# Patient Record
Sex: FEMALE | Race: WHITE | Employment: FULL TIME | URBAN - METROPOLITAN AREA
[De-identification: names, ages, dates, MRNs, and addresses within clinical notes are randomized per-mention and may not be internally consistent; named-entity substitution may affect disease eponyms.]

---

## 2021-04-08 DIAGNOSIS — Z23 ENCOUNTER FOR IMMUNIZATION: ICD-10-CM

## 2021-04-21 ENCOUNTER — IMMUNIZATIONS (OUTPATIENT)
Dept: FAMILY MEDICINE CLINIC | Facility: HOSPITAL | Age: 56
End: 2021-04-21

## 2021-04-21 DIAGNOSIS — Z23 ENCOUNTER FOR IMMUNIZATION: Primary | ICD-10-CM

## 2021-04-21 PROCEDURE — 91300 SARS-COV-2 / COVID-19 MRNA VACCINE (PFIZER-BIONTECH) 30 MCG: CPT

## 2021-04-21 PROCEDURE — 0001A SARS-COV-2 / COVID-19 MRNA VACCINE (PFIZER-BIONTECH) 30 MCG: CPT

## 2021-04-23 RX ORDER — IBUPROFEN 200 MG
1 TABLET ORAL AS NEEDED
COMMUNITY

## 2021-04-23 RX ORDER — LORAZEPAM 0.5 MG/1
1 TABLET ORAL AS NEEDED
COMMUNITY
Start: 2021-03-10

## 2021-04-23 RX ORDER — PSEUDOEPHEDRINE HYDROCHLORIDE 30 MG/1
1 TABLET ORAL AS NEEDED
COMMUNITY

## 2021-04-24 PROBLEM — N83.292 OTHER OVARIAN CYST, LEFT SIDE: Status: ACTIVE | Noted: 2021-04-24

## 2021-04-24 PROBLEM — N92.0 MENORRHAGIA WITH REGULAR CYCLE: Status: ACTIVE | Noted: 2021-04-24

## 2021-04-24 PROBLEM — Z86.711 HISTORY OF PULMONARY EMBOLISM: Status: ACTIVE | Noted: 2021-04-24

## 2021-04-24 PROBLEM — D25.9 UTERINE FIBROID: Status: ACTIVE | Noted: 2021-04-24

## 2021-04-26 ENCOUNTER — ANNUAL EXAM (OUTPATIENT)
Dept: OBGYN CLINIC | Facility: CLINIC | Age: 56
End: 2021-04-26
Payer: COMMERCIAL

## 2021-04-26 VITALS
HEIGHT: 64 IN | BODY MASS INDEX: 23.76 KG/M2 | DIASTOLIC BLOOD PRESSURE: 70 MMHG | SYSTOLIC BLOOD PRESSURE: 112 MMHG | WEIGHT: 139.2 LBS

## 2021-04-26 DIAGNOSIS — N83.292 OTHER OVARIAN CYST, LEFT SIDE: ICD-10-CM

## 2021-04-26 DIAGNOSIS — D25.1 INTRAMURAL LEIOMYOMA OF UTERUS: ICD-10-CM

## 2021-04-26 DIAGNOSIS — Z12.31 BREAST CANCER SCREENING BY MAMMOGRAM: Primary | ICD-10-CM

## 2021-04-26 PROCEDURE — 99396 PREV VISIT EST AGE 40-64: CPT | Performed by: OBSTETRICS & GYNECOLOGY

## 2021-04-26 PROCEDURE — 3008F BODY MASS INDEX DOCD: CPT | Performed by: OBSTETRICS & GYNECOLOGY

## 2021-04-26 PROCEDURE — 1036F TOBACCO NON-USER: CPT | Performed by: OBSTETRICS & GYNECOLOGY

## 2021-04-26 NOTE — ASSESSMENT & PLAN NOTE
Associated with heavy bleeding, last u/s 2/2021 stable overall uterine size; previous adenomyosis comments noted  Previously one 3 6 cm fibroid, last u/s with 4 and 3 6 cm fibroids delineated  Will reevaluate with f/u imaging for ovarian cyst 5/2021

## 2021-04-26 NOTE — ASSESSMENT & PLAN NOTE
Had 10/2020 D&C, followed by reasonable cycle x 2 months, now heavy with clots  Options reviewed again - unable to pursue hormonal management due to h/o PE, declines Lupron, option of ablation reviewed again, definitive therapy with LH BS but significant PE risk reviewed  Will continue to monitor, instructed to call for menses occuring earlier than 21 days, lasting longer than 10 days or for bleeding between cycles  Will f/u hg with PCP, last 2/2021 was 12  Taking oral iron

## 2021-04-26 NOTE — LETTER
April 26, 2021     Ирина Haider MD  [address] [facility] [address]    Patient: Erwin So   YOB: 1965   Date of Visit: 4/26/2021       Dear Dr Amna Augustine:    Thank you for referring Erwin So to me for evaluation  Below are my notes for this consultation  If you have questions, please do not hesitate to call me  I look forward to following your patient along with you  Sincerely,        Yolanda Carter MD        CC: No Recipients  Yolanda Carter MD  4/26/2021  1:37 PM  Sign when Signing Visit  Assessment/Plan:  Continued menorrhagia with regular cycle six months from [facility] D&C  Normal breast exam, mammo order given  Unchanged pelvic exam, u/s follow up after next menses for ovarian cyst and fibroids  Premenstrual NSAIDs encouraged  Pap due 2023  Will f/u hemoglobin with PCP, last normal      Menorrhagia with regular cycle  Had 10/2020 D&C, followed by reasonable cycle x 2 months, now heavy with clots  Options reviewed again - unable to pursue hormonal management due to h/o PE, declines Lupron, option of ablation reviewed again, definitive therapy with LH BS but significant PE risk reviewed  Will continue to monitor, instructed to call for menses occuring earlier than 21 days, lasting longer than 10 days or for bleeding between cycles  Will f/u hg with PCP, last 2/2021 was 12  Taking oral iron  Uterine fibroid  Associated with heavy bleeding, last u/s 2/2021 stable overall uterine size; previous adenomyosis comments noted  Previously one 3 6 cm fibroid, last u/s with 4 and 3 6 cm fibroids delineated  Will reevaluate with f/u imaging for ovarian cyst 5/2021  Other ovarian cyst, left side  Follow up u/s, order given, will contact (isacc davis) with results         Diagnoses and all orders for this visit:    Breast cancer screening by mammogram  -     Mammo screening bilateral w 3d & cad; Future    Intramural leiomyoma of uterus  -     US pelvis complete w transvaginal; Future    Other ovarian cyst, left side  -     US pelvis complete w transvaginal; Future    Other orders  -     Iron, Ferrous Sulfate, 325 (65 Fe) MG TABS; Take 1 tablet by mouth daily  -     LORazepam (Ativan) 0 5 mg tablet; Take 1 tablet by mouth as needed As needed before flying  -     Multiple Vitamin (MULTIVITAMIN PO); Take 1 tablet by mouth daily  -     pseudoephedrine (SUDAFED) 30 mg tablet; Take 1 tablet by mouth as needed  -     ibuprofen (Advil) 200 mg tablet; Take 1 tablet by mouth as needed          Subjective:      Patient ID: Wade Ramos is a 49 y o  female  HPI Presents for well check and discussion of fibroids and menorrhagia  The following portions of the patient's history were reviewed and updated as appropriate: allergies, current medications, past family history, past medical history, past social history, past surgical history and problem list     Review of Systems  No breast, bladder, bowel changes   No new persistent pain, bloating, early satiety or pelvic pressure  Still with heavy, regular menses with clots lasting up to 10 days, no IMB    Objective:      /70 (BP Location: Left arm, Patient Position: Sitting, Cuff Size: Large)   Ht 5' 3 5" (1 613 m)   Wt 63 1 kg (139 lb 3 2 oz)   BMI 24 27 kg/m²          Physical Exam  General appearance: no distress, pleasant  Neck: thyroid without nodules or thyromegaly, no palpable adenopathy  Lymph nodes: no palpable adenopathy  Breasts: no masses, nodes or skin changes  Abdomen: soft, non tender, no palpable masses  Pelvic exam: normal external genitalia, urethral meatus normal, vagina without lesions, cervix without lesions, uterus 8-10 wks, firm, posterior irregular as in past, non tender, no adnexal masses, non tender  Rectal exam: no masses, non tender, RV confirms above

## 2021-04-26 NOTE — PROGRESS NOTES
Assessment/Plan:  Continued menorrhagia with regular cycle six months from [facility] D&C  Normal breast exam, mammo order given  Unchanged pelvic exam, u/s follow up after next menses for ovarian cyst and fibroids  Premenstrual NSAIDs encouraged  Pap due 2023  Will f/u hemoglobin with PCP, last normal      Menorrhagia with regular cycle  Had 10/2020 D&C, followed by reasonable cycle x 2 months, now heavy with clots  Options reviewed again - unable to pursue hormonal management due to h/o PE, declines Lupron, option of ablation reviewed again, definitive therapy with LH BS but significant PE risk reviewed  Will continue to monitor, instructed to call for menses occuring earlier than 21 days, lasting longer than 10 days or for bleeding between cycles  Will f/u hg with PCP, last 2/2021 was 12  Taking oral iron  Uterine fibroid  Associated with heavy bleeding, last u/s 2/2021 stable overall uterine size; previous adenomyosis comments noted  Previously one 3 6 cm fibroid, last u/s with 4 and 3 6 cm fibroids delineated  Will reevaluate with f/u imaging for ovarian cyst 5/2021  Other ovarian cyst, left side  Follow up u/s, order given, will contact (isacc davis) with results  Diagnoses and all orders for this visit:    Breast cancer screening by mammogram  -     Mammo screening bilateral w 3d & cad; Future    Intramural leiomyoma of uterus  -     US pelvis complete w transvaginal; Future    Other ovarian cyst, left side  -     US pelvis complete w transvaginal; Future    Other orders  -     Iron, Ferrous Sulfate, 325 (65 Fe) MG TABS; Take 1 tablet by mouth daily  -     LORazepam (Ativan) 0 5 mg tablet; Take 1 tablet by mouth as needed As needed before flying  -     Multiple Vitamin (MULTIVITAMIN PO); Take 1 tablet by mouth daily  -     pseudoephedrine (SUDAFED) 30 mg tablet; Take 1 tablet by mouth as needed  -     ibuprofen (Advil) 200 mg tablet;  Take 1 tablet by mouth as needed          Subjective:      Patient ID: James Scruggs is a 49 y o  female  HPI Presents for well check and discussion of fibroids and menorrhagia  The following portions of the patient's history were reviewed and updated as appropriate: allergies, current medications, past family history, past medical history, past social history, past surgical history and problem list     Review of Systems  No breast, bladder, bowel changes   No new persistent pain, bloating, early satiety or pelvic pressure  Still with heavy, regular menses with clots lasting up to 10 days, no IMB    Objective:      /70 (BP Location: Left arm, Patient Position: Sitting, Cuff Size: Large)   Ht 5' 3 5" (1 613 m)   Wt 63 1 kg (139 lb 3 2 oz)   BMI 24 27 kg/m²          Physical Exam  General appearance: no distress, pleasant  Neck: thyroid without nodules or thyromegaly, no palpable adenopathy  Lymph nodes: no palpable adenopathy  Breasts: no masses, nodes or skin changes  Abdomen: soft, non tender, no palpable masses  Pelvic exam: normal external genitalia, urethral meatus normal, vagina without lesions, cervix without lesions, uterus 8-10 wks, firm, posterior irregular as in past, non tender, no adnexal masses, non tender  Rectal exam: no masses, non tender, RV confirms above

## 2021-05-15 ENCOUNTER — IMMUNIZATIONS (OUTPATIENT)
Dept: FAMILY MEDICINE CLINIC | Facility: HOSPITAL | Age: 56
End: 2021-05-15

## 2021-05-15 DIAGNOSIS — Z23 ENCOUNTER FOR IMMUNIZATION: Primary | ICD-10-CM

## 2021-05-15 PROCEDURE — 0002A SARS-COV-2 / COVID-19 MRNA VACCINE (PFIZER-BIONTECH) 30 MCG: CPT

## 2021-05-15 PROCEDURE — 91300 SARS-COV-2 / COVID-19 MRNA VACCINE (PFIZER-BIONTECH) 30 MCG: CPT

## 2021-06-09 ENCOUNTER — TELEPHONE (OUTPATIENT)
Dept: OBGYN CLINIC | Facility: CLINIC | Age: 56
End: 2021-06-09

## 2021-06-09 NOTE — TELEPHONE ENCOUNTER
Spoke with pt, reviewed ultrasound from 6/3/21: Uterus 8 7 x 7 0 cm with EMS 4 mm  Fibroids: ant 5 3 cm (prev 4 3 cm), posterior 3 8 cm (prev 3 0 cm)  Ovaries not visualzied, no masses or fluid noted  2/2021 u/s Ut 10 cm with EMS 7 mm  4 3 ant fundal and 3 cm post fibroids  R ov 2 7 cm with 1 5 cm cyst; L ov 3 2 cm with 2 8 cm septated cyst    Had Hg with PCP, was told normal  Continues with heavy cycles, monthly  Discussed stability of fibroids, reassuring ultrasound despite nonvisualization of ovaries (with normal adnexa, no fluid in pelvis)  Will continue to monitor menses and contact with menses occuring earlier than 21 days, lasting longer than 10 days or for bleeding between cycles or significant increase in flow  Agrees to plan

## 2021-08-16 ENCOUNTER — VBI (OUTPATIENT)
Dept: ADMINISTRATIVE | Facility: OTHER | Age: 56
End: 2021-08-16

## 2021-08-16 NOTE — TELEPHONE ENCOUNTER
Upon review of the In Basket request we were able to locate, review, and update the patient chart as requested for Mammogram     Any additional questions or concerns should be emailed to the Practice Liaisons via [email or web address] email, please do not reply via In Basket      Thank you  Orlando Chadwick

## 2021-10-26 ENCOUNTER — OFFICE VISIT (OUTPATIENT)
Dept: OBGYN CLINIC | Facility: CLINIC | Age: 56
End: 2021-10-26
Payer: COMMERCIAL

## 2021-10-26 VITALS
SYSTOLIC BLOOD PRESSURE: 110 MMHG | HEIGHT: 64 IN | WEIGHT: 136 LBS | BODY MASS INDEX: 23.22 KG/M2 | DIASTOLIC BLOOD PRESSURE: 60 MMHG

## 2021-10-26 DIAGNOSIS — N92.0 MENORRHAGIA WITH REGULAR CYCLE: ICD-10-CM

## 2021-10-26 DIAGNOSIS — Z12.31 BREAST CANCER SCREENING BY MAMMOGRAM: Primary | ICD-10-CM

## 2021-10-26 DIAGNOSIS — D25.1 INTRAMURAL LEIOMYOMA OF UTERUS: ICD-10-CM

## 2021-10-26 PROCEDURE — 99213 OFFICE O/P EST LOW 20 MIN: CPT | Performed by: OBSTETRICS & GYNECOLOGY

## 2021-10-26 PROCEDURE — 58100 BIOPSY OF UTERUS LINING: CPT | Performed by: OBSTETRICS & GYNECOLOGY

## 2021-10-27 ENCOUNTER — TELEPHONE (OUTPATIENT)
Dept: OBGYN CLINIC | Facility: CLINIC | Age: 56
End: 2021-10-27

## 2021-10-29 LAB
QUESTION/PROBLEM: NORMAL
SL AMB CONTAINER TYPE: NORMAL
SL AMB FINAL RESOLUTION: NORMAL
SL AMB REPORT STATUS: NORMAL

## 2022-04-29 ENCOUNTER — PATIENT MESSAGE (OUTPATIENT)
Dept: OBGYN CLINIC | Facility: CLINIC | Age: 57
End: 2022-04-29

## 2022-04-29 DIAGNOSIS — Z12.31 ENCOUNTER FOR SCREENING MAMMOGRAM FOR MALIGNANT NEOPLASM OF BREAST: Primary | ICD-10-CM

## 2022-04-29 NOTE — TELEPHONE ENCOUNTER
Pt would like to  her order (H) office and is aware an order for her [address] will be provided at her upcoming appointment

## 2022-04-29 NOTE — TELEPHONE ENCOUNTER
Order placed, please fax to [facility] and inform pt  Her u/s will be due after her visit scheduled in June Thanks

## 2022-06-02 NOTE — PROGRESS NOTES
Assessment/Plan:    Encounter for gynecological examination (general) (routine) without abnormal findings  Here for well check, continues with prolonged bleeding  Less heavy than in the past but all month long, now postcoital component  Several month diaries reviewed with 6-14 days without bleeding or spotting every month  Frustrated, done  Normal breast and pelvic exams  Pap done  Mammo order given  Due for colonoscopy  Normal endometrial biopsy from 10/2021  U/S due, order given  TSH and CBC orders given   Will RTO for repeat endometrial biopsy and further discussion of definitive surgery  Aware of increased risk of thrombosis inherent with gyn surgery and personal h/o PE  Discussed anticoagulation after surgery to decrease risk  Discussed low prevalence of malignancy in fibroids and difficulty diagnosis prior to surgery  Option of surgery with gyn onc reviewed - previously went [facility] for [facility]oscopy and D&C with Dr Paulina Garcia       Diagnoses and all orders for this visit:    Encounter for gynecological examination (general) (routine) without abnormal findings    Breast cancer screening by mammogram  -     Mammo screening bilateral w 3d & cad; Future    Intramural leiomyoma of uterus  -     US pelvis complete w transvaginal; Future    Menorrhagia with regular cycle  -     US pelvis complete w transvaginal; Future  -     CBC and differential; Future  -     TSH, 3rd generation with Free T4 reflex; Future  -     CBC and differential  -     TSH, 3rd generation with Free T4 reflex    Screening for malignant neoplasm of the cervix  -     Thinprep Tis Pap and HPV mRNA E6/E7 Reflex HPV 16,18/45          Subjective:      Patient ID: Beth Chaparro is a 56 y o  female  Here for well check        The following portions of the patient's history were reviewed and updated as appropriate:   She  has a past medical history of Anemia, H/O mammogram (02/16/2021), H/O pelvic ultrasound (02/16/2021), History of endometrial biopsy (08/31/2020), History of melanoma, Melanoma of upper arm ([facility] [address] ), Menorrhagia, Papanicolaou smear (02/2018), and Pulmonary embolus ([facility] [address] )  She   Patient Active Problem List    Diagnosis Date Noted    Encounter for gynecological examination (general) (routine) without abnormal findings 06/07/2022    History of pulmonary embolism 04/24/2021    Menorrhagia with regular cycle 04/24/2021    Uterine fibroid 04/24/2021    Other ovarian cyst, left side 04/24/2021     She  has a past surgical history that includes Dilation and curettage of uterus (2001); [place] tooth extraction (1985); Tonsillectomy (1972); Skin cancer excision (2017); Combined hysteroscopy diagnostic / D&C (10/2020); Mammo (historical) (Bilateral, 02/16/2021); Combined hysteroscopy diagnostic / D&C; Tonsillectomy; Skin cancer excision (2017); Hysteroscopy w/ polypectomy (10/2020); [place] tooth extraction; and Colonoscopy (2016)  Her family history is not on file  She  reports that she has never smoked  She has never used smokeless tobacco  She reports current alcohol use  She reports that she does not use drugs  Current Outpatient Medications   Medication Sig Dispense Refill    ibuprofen (MOTRIN) 200 mg tablet Take 1 tablet by mouth as needed      Iron, Ferrous Sulfate, 325 (65 Fe) MG TABS Take 1 tablet by mouth daily      LORazepam (ATIVAN) 0 5 mg tablet Take 1 tablet by mouth as needed As needed before flying      Multiple Vitamin (MULTIVITAMIN PO) Take 1 tablet by mouth daily      pseudoephedrine (SUDAFED) 30 mg tablet Take 1 tablet by mouth as needed       No current facility-administered medications for this visit  She is allergic to sulfa antibiotics       Review of Systems  +prolonged bleeding  No breast, bladder, bowel changes   No abdominal pain    Objective:      /76   Ht 5' 3 5" (1 613 m)   Wt 60 8 kg (134 lb)   LMP 06/06/2022 (Exact Date) Comment: irregular cycles  Breastfeeding No   BMI 23 36 kg/m² Physical Exam    General appearance: no distress, pleasant  Neck: thyroid without nodules or thyromegaly, no palpable adenopathy  Lymph nodes: no palpable adenopathy  Breasts: no masses, nodes or skin changes  Abdomen: soft, non tender, no palpable masses  Pelvic exam: normal external genitalia, urethral meatus normal, vagina without lesions, cervix without lesions,  uterus 8-10 wks, firm, posterior irregular as in past,l, non tender, no adnexal masses  Rectal exam: no masses, non tender, RV confirms above

## 2022-06-07 ENCOUNTER — ANNUAL EXAM (OUTPATIENT)
Dept: OBGYN CLINIC | Facility: CLINIC | Age: 57
End: 2022-06-07
Payer: COMMERCIAL

## 2022-06-07 VITALS
WEIGHT: 134 LBS | HEIGHT: 64 IN | DIASTOLIC BLOOD PRESSURE: 76 MMHG | BODY MASS INDEX: 22.88 KG/M2 | SYSTOLIC BLOOD PRESSURE: 122 MMHG

## 2022-06-07 DIAGNOSIS — Z01.419 ENCOUNTER FOR GYNECOLOGICAL EXAMINATION (GENERAL) (ROUTINE) WITHOUT ABNORMAL FINDINGS: Primary | ICD-10-CM

## 2022-06-07 DIAGNOSIS — Z12.4 SCREENING FOR MALIGNANT NEOPLASM OF THE CERVIX: ICD-10-CM

## 2022-06-07 DIAGNOSIS — Z12.31 BREAST CANCER SCREENING BY MAMMOGRAM: ICD-10-CM

## 2022-06-07 DIAGNOSIS — N92.0 MENORRHAGIA WITH REGULAR CYCLE: ICD-10-CM

## 2022-06-07 DIAGNOSIS — D25.1 INTRAMURAL LEIOMYOMA OF UTERUS: ICD-10-CM

## 2022-06-07 PROCEDURE — 99396 PREV VISIT EST AGE 40-64: CPT | Performed by: OBSTETRICS & GYNECOLOGY

## 2022-06-07 NOTE — LETTER
June 7, 2022     Anish Becerra MD  [address] [facility] [address]    Patient: Parth Montelongo   YOB: 1965   Date of Visit: 6/7/2022       Dear Dr Skip Avila:    Thank you for referring Rosangela Espitia to me for evaluation  Below are my notes for this consultation  If you have questions, please do not hesitate to call me  I look forward to following your patient along with you  Sincerely,        Elzbieta Castillo MD        CC: No Recipients  Elzbieta Castillo MD  6/7/2022  4:30 PM  Sign when Signing Visit  Assessment/Plan:    Encounter for gynecological examination (general) (routine) without abnormal findings  Here for well check, continues with prolonged bleeding  Less heavy than in the past but all month long, now postcoital component  Several month diaries reviewed with 6-14 days without bleeding or spotting every month  Frustrated, done  Normal breast and pelvic exams  Pap done  Mammo order given  Due for colonoscopy  Normal endometrial biopsy from 10/2021  U/S due, order given  TSH and CBC orders given   Will RTO for repeat endometrial biopsy and further discussion of definitive surgery  Aware of increased risk of thrombosis inherent with gyn surgery and personal h/o PE  Discussed anticoagulation after surgery to decrease risk  Discussed low prevalence of malignancy in fibroids and difficulty diagnosis prior to surgery  Option of surgery with gyn onc reviewed - previously went to [facility] for hysteroscopy and D&C with Dr Denise Garcia       Diagnoses and all orders for this visit:    Encounter for gynecological examination (general) (routine) without abnormal findings    Breast cancer screening by mammogram  -     Mammo screening bilateral w 3d & cad; Future    Intramural leiomyoma of uterus  -     US pelvis complete w transvaginal; Future    Menorrhagia with regular cycle  -     US pelvis complete w transvaginal; Future  -     CBC and differential; Future  -     TSH, 3rd generation with Free T4 reflex; Future  -     CBC and differential  -     TSH, 3rd generation with Free T4 reflex    Screening for malignant neoplasm of the cervix  -     Thinprep Tis Pap and HPV mRNA E6/E7 Reflex HPV 16,18/45          Subjective:      Patient ID: Johanne Rubalcava is a 56 y o  female  Here for well check  The following portions of the patient's history were reviewed and updated as appropriate:   She  has a past medical history of Anemia, H/O mammogram (02/16/2021), H/O pelvic ultrasound (02/16/2021), History of endometrial biopsy (08/31/2020), History of melanoma, Melanoma of upper arm ([facility] [address] ), Menorrhagia, Papanicolaou smear (02/2018), and Pulmonary embolus ([facility] [address] )  She   Patient Active Problem List    Diagnosis Date Noted    Encounter for gynecological examination (general) (routine) without abnormal findings 06/07/2022    History of pulmonary embolism 04/24/2021    Menorrhagia with regular cycle 04/24/2021    Uterine fibroid 04/24/2021    Other ovarian cyst, left side 04/24/2021     She  has a past surgical history that includes Dilation and curettage of uterus (2001); [place] tooth extraction (1985); Tonsillectomy (1972); Skin cancer excision (2017); Combined hysteroscopy diagnostic / D&C (10/2020); Mammo (historical) (Bilateral, 02/16/2021); Combined hysteroscopy diagnostic / D&C; Tonsillectomy; Skin cancer excision (2017); Hysteroscopy w/ polypectomy (10/2020); [place] tooth extraction; and Colonoscopy (2016)  Her family history is not on file  She  reports that she has never smoked  She has never used smokeless tobacco  She reports current alcohol use  She reports that she does not use drugs    Current Outpatient Medications   Medication Sig Dispense Refill    ibuprofen (MOTRIN) 200 mg tablet Take 1 tablet by mouth as needed      Iron, Ferrous Sulfate, 325 (65 Fe) MG TABS Take 1 tablet by mouth daily      LORazepam (ATIVAN) 0 5 mg tablet Take 1 tablet by mouth as needed As needed before flying      Multiple Vitamin (MULTIVITAMIN PO) Take 1 tablet by mouth daily      pseudoephedrine (SUDAFED) 30 mg tablet Take 1 tablet by mouth as needed       No current facility-administered medications for this visit  She is allergic to sulfa antibiotics       Review of Systems  +prolonged bleeding  No breast, bladder, bowel changes   No abdominal pain    Objective:      /76   Ht 5' 3 5" (1 613 m)   Wt 60 8 kg (134 lb)   LMP 06/06/2022 (Exact Date) Comment: irregular cycles  Breastfeeding No   BMI 23 36 kg/m²          Physical Exam    General appearance: no distress, pleasant  Neck: thyroid without nodules or thyromegaly, no palpable adenopathy  Lymph nodes: no palpable adenopathy  Breasts: no masses, nodes or skin changes  Abdomen: soft, non tender, no palpable masses  Pelvic exam: normal external genitalia, urethral meatus normal, vagina without lesions, cervix without lesions,  uterus 8-10 wks, firm, posterior irregular as in past,l, non tender, no adnexal masses  Rectal exam: no masses, non tender, RV confirms above

## 2022-06-07 NOTE — ASSESSMENT & PLAN NOTE
Here for well check, continues with prolonged bleeding  Less heavy than in the past but all month long, now postcoital component  Several month diaries reviewed with 6-14 days without bleeding or spotting every month  Frustrated, done  Normal breast and pelvic exams  Pap done  Mammo order given  Due for colonoscopy  Normal endometrial biopsy from 10/2021  U/S due, order given  TSH and CBC orders given   Will RTO for repeat endometrial biopsy and further discussion of definitive surgery  Aware of increased risk of thrombosis inherent with gyn surgery and personal h/o PE  Discussed anticoagulation after surgery to decrease risk  Discussed low prevalence of malignancy in fibroids and difficulty diagnosis prior to surgery  Option of surgery with gyn onc reviewed - previously went [facility] and [id number]    [place]

## 2022-06-10 LAB
CLINICAL INFO: NORMAL
CYTO CVX: NORMAL
CYTOLOGY CMNT CVX/VAG CYTO-IMP: NORMAL
DATE PREVIOUS BX: NORMAL
HPV E6+E7 MRNA CVX QL NAA+PROBE: NOT DETECTED
LMP START DATE: NORMAL
SL AMB PREV. PAP:: NORMAL
SPECIMEN SOURCE CVX/VAG CYTO: NORMAL

## 2022-06-14 LAB
BASOPHILS # BLD AUTO: 19 CELLS/UL (ref 0–200)
BASOPHILS NFR BLD AUTO: 0.6 %
EOSINOPHIL # BLD AUTO: 90 CELLS/UL (ref 15–500)
EOSINOPHIL NFR BLD AUTO: 2.8 %
ERYTHROCYTE [DISTWIDTH] IN BLOOD BY AUTOMATED COUNT: 11.1 % (ref 11–15)
HCT VFR BLD AUTO: 37.5 % (ref 35–45)
HGB BLD-MCNC: 13.1 G/DL (ref 11.7–15.5)
LYMPHOCYTES # BLD AUTO: 1024 CELLS/UL (ref 850–3900)
LYMPHOCYTES NFR BLD AUTO: 32 %
MCH RBC QN AUTO: 32.5 PG (ref 27–33)
MCHC RBC AUTO-ENTMCNC: 34.9 G/DL (ref 32–36)
MCV RBC AUTO: 93.1 FL (ref 80–100)
MONOCYTES # BLD AUTO: 262 CELLS/UL (ref 200–950)
MONOCYTES NFR BLD AUTO: 8.2 %
NEUTROPHILS # BLD AUTO: 1805 CELLS/UL (ref 1500–7800)
NEUTROPHILS NFR BLD AUTO: 56.4 %
PLATELET # BLD AUTO: 133 THOUSAND/UL (ref 140–400)
PMV BLD REES-ECKER: 9.3 FL (ref 7.5–12.5)
RBC # BLD AUTO: 4.03 MILLION/UL (ref 3.8–5.1)
TSH SERPL-ACNC: 1.93 MIU/L
WBC # BLD AUTO: 3.2 THOUSAND/UL (ref 3.8–10.8)

## 2022-06-22 NOTE — PROGRESS NOTES
Assessment/Plan:    Uterine fibroid  U/s reviewed with 9 cm uterus, largest fibroid 3 6 cm (slightly smaller), EMS 4 mm and normal lab evaluation with normal TSH and CBC  No bleeding since 6/6/22  Endo bx with ease  Lupron option discussed and surgical therapy with anticoag prophylaxis   with recent shoulder issues, will wait until things settle at home prior to deciding plan  Diagnoses and all orders for this visit:    Pre-procedure lab exam  -     POCT urine HCG    Intramural leiomyoma of uterus  -     Tissue Pathology    Menorrhagia with regular cycle  -     Tissue Pathology  -     Endometrial biopsy          Subjective:      Patient ID: Enid Arrieta is a 56 y o  female  Here to discuss ultrasound and lab work    The following portions of the patient's history were reviewed and updated as appropriate:   She  has a past medical history of Anemia, H/O mammogram (02/16/2021), H/O pelvic ultrasound (02/16/2021), History of endometrial biopsy (08/31/2020), History of melanoma, Melanoma of upper arm ([facility] [address] ), Menorrhagia, Papanicolaou smear (02/2018), and Pulmonary embolus ([facility] [address] )  She   Patient Active Problem List    Diagnosis Date Noted    Encounter for gynecological examination (general) (routine) without abnormal findings 06/07/2022    History of pulmonary embolism 04/24/2021    Uterine fibroid 04/24/2021    Other ovarian cyst, left side 04/24/2021     She  has a past surgical history that includes Dilation and curettage of uterus (2001); [facility] tooth extraction (1985); Tonsillectomy (1972); Skin cancer excision (2017); Combined hysteroscopy diagnostic / D&C (10/2020); Mammo (historical) (Bilateral, 02/16/2021); Combined hysteroscopy diagnostic / D&C; Tonsillectomy; Skin cancer excision (2017); Hysteroscopy w/ polypectomy (10/2020); [facility] tooth extraction; and Colonoscopy (2016)  Her family history is not on file  She  reports that she has never smoked   She has never used smokeless tobacco  She reports current alcohol use  She reports that she does not use drugs  Current Outpatient Medications   Medication Sig Dispense Refill    ibuprofen (MOTRIN) 200 mg tablet Take 1 tablet by mouth as needed      Iron, Ferrous Sulfate, 325 (65 Fe) MG TABS Take 1 tablet by mouth daily      LORazepam (ATIVAN) 0 5 mg tablet Take 1 tablet by mouth as needed As needed before flying      Multiple Vitamin (MULTIVITAMIN PO) Take 1 tablet by mouth daily      pseudoephedrine (SUDAFED) 30 mg tablet Take 1 tablet by mouth as needed       No current facility-administered medications for this visit  She is allergic to sulfa antibiotics       Review of Systems  No bleeding for 18 days    Objective:      /70   Ht 5' 3 75" (1 619 m)   Wt 60 7 kg (133 lb 12 8 oz)   LMP 06/06/2022 (Exact Date) Comment: irregular cycles  BMI 23 15 kg/m²          Physical Exam      Appears well, no apparent distress  Does not appear anxious or depressed  6/15/22 ultrasound:  Uterus 9 x 6 cm with 3 6 cm posterior, 2 5 cm right sided fibroids  Smaller in comparison to 6/2021  EMS 4 mm  Ovaries not visualized  Endometrial biopsy    Date/Time: 6/24/2022 2:14 PM  Performed by: Anselmo Renner MD  Authorized by: Anselmo Renner MD   Universal Protocol:  Consent: Verbal consent obtained  Written consent obtained    Risks and benefits: risks, benefits and alternatives were discussed  Consent given by: patient  Patient understanding: patient states understanding of the procedure being performed  Patient consent: the patient's understanding of the procedure matches consent given  Relevant documents: relevant documents present and verified  Patient identity confirmed: verbally with patient      Procedure:     A bivalve speculum was placed in the vagina: yes      Cervix cleaned and prepped: yes      A paracervical block was performed: no      Uterus sounded: yes      Uterus sound depth (cm):  7    Specimen collected: specimen collected and sent to pathology      Patient tolerated procedure well with no complications: yes    Findings:     Uterus size:  Non-gravid    Cervix: normal

## 2022-06-24 ENCOUNTER — OFFICE VISIT (OUTPATIENT)
Dept: OBGYN CLINIC | Facility: CLINIC | Age: 57
End: 2022-06-24
Payer: COMMERCIAL

## 2022-06-24 VITALS
BODY MASS INDEX: 22.84 KG/M2 | DIASTOLIC BLOOD PRESSURE: 70 MMHG | WEIGHT: 133.8 LBS | SYSTOLIC BLOOD PRESSURE: 128 MMHG | HEIGHT: 64 IN

## 2022-06-24 DIAGNOSIS — D25.1 INTRAMURAL LEIOMYOMA OF UTERUS: ICD-10-CM

## 2022-06-24 DIAGNOSIS — N92.0 MENORRHAGIA WITH REGULAR CYCLE: ICD-10-CM

## 2022-06-24 DIAGNOSIS — Z01.812 PRE-PROCEDURE LAB EXAM: Primary | ICD-10-CM

## 2022-06-24 LAB — SL AMB POCT URINE HCG: NEGATIVE

## 2022-06-24 PROCEDURE — 99213 OFFICE O/P EST LOW 20 MIN: CPT | Performed by: OBSTETRICS & GYNECOLOGY

## 2022-06-24 PROCEDURE — 58100 BIOPSY OF UTERUS LINING: CPT | Performed by: OBSTETRICS & GYNECOLOGY

## 2022-06-24 PROCEDURE — 81025 URINE PREGNANCY TEST: CPT | Performed by: OBSTETRICS & GYNECOLOGY

## 2022-06-24 NOTE — LETTER
June 27, 2022     Dee Dee Veronica MD  [address] [facility] [address]    Patient: Na Pelayo   YOB: 1965   Date of Visit: 6/24/2022       Dear Dr Miguel A Hawkins:    Thank you for referring Mohan Yousif to me for evaluation  Below are my notes for this consultation  If you have questions, please do not hesitate to call me  I look forward to following your patient along with you  Sincerely,        Leila Hart MD        CC: No Recipients  Leila Hart MD  6/24/2022  3:43 PM  Sign when Signing Visit  Assessment/Plan:    Uterine fibroid  U/s reviewed with 9 cm uterus, largest fibroid 3 6 cm (slightly smaller), EMS 4 mm and normal lab evaluation with normal TSH and CBC  No bleeding since 6/6/22  Endo bx with ease  Lupron option discussed and surgical therapy with anticoag prophylaxis   with recent shoulder issues, will wait until things settle at home prior to deciding plan  Diagnoses and all orders for this visit:    Pre-procedure lab exam  -     POCT urine HCG    Intramural leiomyoma of uterus  -     Tissue Pathology    Menorrhagia with regular cycle  -     Tissue Pathology  -     Endometrial biopsy          Subjective:      Patient ID: Na Pelayo is a 56 y o  female  Here to discuss ultrasound and lab work    The following portions of the patient's history were reviewed and updated as appropriate:   She  has a past medical history of Anemia, H/O mammogram (02/16/2021), H/O pelvic ultrasound (02/16/2021), History of endometrial biopsy (08/31/2020), History of melanoma, Melanoma of upper arm ([address] ), Menorrhagia, Papanicolaou smear (02/2018), and Pulmonary embolus ([address] )    She   Patient Active Problem List    Diagnosis Date Noted    Encounter for gynecological examination (general) (routine) without abnormal findings 06/07/2022    History of pulmonary embolism 04/24/2021    Uterine fibroid 04/24/2021    Other ovarian cyst, left side 04/24/2021     She  has a past surgical history that includes Dilation and curettage of uterus (2001); [place] tooth extraction (1985); Tonsillectomy (1972); Skin cancer excision (2017); Combined hysteroscopy diagnostic / D&C (10/2020); Mammo (historical) (Bilateral, 02/16/2021); Combined hysteroscopy diagnostic / D&C; Tonsillectomy; Skin cancer excision (2017); Hysteroscopy w/ polypectomy (10/2020); [place] tooth extraction; and Colonoscopy (2016)  Her family history is not on file  She  reports that she has never smoked  She has never used smokeless tobacco  She reports current alcohol use  She reports that she does not use drugs  Current Outpatient Medications   Medication Sig Dispense Refill    ibuprofen (MOTRIN) 200 mg tablet Take 1 tablet by mouth as needed      Iron, Ferrous Sulfate, 325 (65 Fe) MG TABS Take 1 tablet by mouth daily      LORazepam (ATIVAN) 0 5 mg tablet Take 1 tablet by mouth as needed As needed before flying      Multiple Vitamin (MULTIVITAMIN PO) Take 1 tablet by mouth daily      pseudoephedrine (SUDAFED) 30 mg tablet Take 1 tablet by mouth as needed       No current facility-administered medications for this visit  She is allergic to sulfa antibiotics       Review of Systems  No bleeding for 18 days    Objective:      /70   Ht 5' 3 75" (1 619 m)   Wt 60 7 kg (133 lb 12 8 oz)   LMP 06/06/2022 (Exact Date) Comment: irregular cycles  BMI 23 15 kg/m²          Physical Exam      Appears well, no apparent distress  Does not appear anxious or depressed  6/15/22 ultrasound:  Uterus 9 x 6 cm with 3 6 cm posterior, 2 5 cm right sided fibroids  Smaller in comparison to 6/2021  EMS 4 mm  Ovaries not visualized  Endometrial biopsy    Date/Time: 6/24/2022 2:14 PM  Performed by: Lalit Dove MD  Authorized by: Lalit Dove MD   Universal Protocol:  Consent: Verbal consent obtained  Written consent obtained    Risks and benefits: risks, benefits and alternatives were discussed  Consent given by: patient  Patient understanding: patient states understanding of the procedure being performed  Patient consent: the patient's understanding of the procedure matches consent given  Relevant documents: relevant documents present and verified  Patient identity confirmed: verbally with patient      Procedure:     A bivalve speculum was placed in the vagina: yes      Cervix cleaned and prepped: yes      A paracervical block was performed: no      Uterus sounded: yes      Uterus sound depth (cm):  7    Specimen collected: specimen collected and sent to pathology      Patient tolerated procedure well with no complications: yes    Findings:     Uterus size:  Non-gravid    Cervix: normal

## 2022-06-27 ENCOUNTER — TELEPHONE (OUTPATIENT)
Dept: OBGYN CLINIC | Facility: CLINIC | Age: 57
End: 2022-06-27

## 2022-06-27 NOTE — TELEPHONE ENCOUNTER
----- Message from Alisson Krueger MD sent at 6/24/2022  3:45 PM EDT -----  Regarding: Lupron approval  Please verify insurance coverage of Lupron for fibroids and menorrhagia  Three months of 3 75 mg IM    Thanks

## 2022-06-28 LAB
PROCEDURE TYPE: NORMAL
SPECIMEN SOURCE: NORMAL

## 2022-07-06 NOTE — TELEPHONE ENCOUNTER
Attempted to call F/U # provided by cover my meds  Voice message indicating clinicals and prior auth form must be faxed to consider approval for medication  Printed lupron prior auth form, completed and faxed with recent progress notes, imaging and EMB result to mellisa successfully

## 2022-07-13 NOTE — TELEPHONE ENCOUNTER
Contacted provider services # on card, per prior auth department, no prior auth needed  Contacted member benefit service to determine coverage and which specialty pharmacy is associated with plan  Spoke with Iesha Bird, after 25 min call, she was unable to provide cost information and coverge for lupron  She did not have immediate access to which specialty pharmacy would be indicated  This nurse researched Rx bin # and was able to match specialty pharmacy of [facility]  Per jarret, she agreed we [facility]  Provided Ref # [id number]  [facility]  to obtain information on where to forward script Completed and faxed to Richard spivey [phone]

## 2022-07-13 NOTE — TELEPHONE ENCOUNTER
Called help desk for [employer]  Provided with alternate fax#[phone]   and phone # [phone]   Refaxed to new fax# [id number]

## 2022-07-14 NOTE — TELEPHONE ENCOUNTER
Received additional prior auth form from [facility] for completion  Completed form faxed to [facility] with clinicals as previously provided to [phone], confirmation received

## 2022-07-14 NOTE — PROGRESS NOTES
Reviewed options for Lupron, alternatives not appropriate as add back estrogen included  Pt with h/o thrombosis

## 2022-07-18 DIAGNOSIS — N92.0 MENORRHAGIA WITH REGULAR CYCLE: ICD-10-CM

## 2022-07-18 DIAGNOSIS — D25.1 INTRAMURAL LEIOMYOMA OF UTERUS: Primary | ICD-10-CM

## 2022-07-18 NOTE — TELEPHONE ENCOUNTER
Agree with above  I would recommend forwarding rx to [facility] and start the process to see the $ and have available for treatment    Thanks

## 2022-07-18 NOTE — TELEPHONE ENCOUNTER
Added [facility] specialty pharmacy to chart  Please generate and forward rx

## 2022-07-18 NOTE — TELEPHONE ENCOUNTER
Contacted Vandana to advise insurance approved lupron  Next step is for rx to be sent to her specialty pharmacy [facility] who will contact her to discuss cost and arrange for delivery  Vandana reports she has not had any bleeding for 2 weeks and would like to hold off on starting lupron treatment for now  Recommended we still forward rx to [facility] to start the process  She can decline shipment when she receives call from pharmacy if that is still what she would like to do  Understands message being sent to Dr Joiner Likens to review and provide her recommendation on delay of treatment

## 2022-07-18 NOTE — TELEPHONE ENCOUNTER
Received prior auth approval for lupron from [facility]  Document scanned to chart  Script needs to be transmitted to [employer]

## 2022-11-25 PROBLEM — Z90.710 HISTORY OF HYSTERECTOMY: Status: ACTIVE | Noted: 2021-04-24

## 2023-09-06 ENCOUNTER — TELEPHONE (OUTPATIENT)
Dept: OBGYN CLINIC | Facility: CLINIC | Age: 58
End: 2023-09-06

## 2023-09-06 DIAGNOSIS — Z12.31 ENCOUNTER FOR SCREENING MAMMOGRAM FOR MALIGNANT NEOPLASM OF BREAST: Primary | ICD-10-CM

## 2023-09-06 NOTE — TELEPHONE ENCOUNTER
Patient called into office requesting a mammogram script to get completed before coming in for annual appointment. Mammo placed. Patient advised to print out through [address] [place] [address] or to come in and pick a hard copy up in office. Patient verbalized understanding and reports she will attempt on [employer].

## 2023-10-26 PROBLEM — N83.292 OTHER OVARIAN CYST, LEFT SIDE: Status: RESOLVED | Noted: 2021-04-24 | Resolved: 2023-10-26

## 2023-10-26 NOTE — PROGRESS NOTES
Assessment/Plan:    Encounter for gynecological examination (general) (routine) without abnormal findings  Here for well check, SO happy after hysterectomy for benign disease. No menopausal concerns, no breast or gyn complaints. Normal breast and pelvic exams. Last pap 6//7/22 neg/HPV neg; now s/p hysterectomy  Mammo order given, last 5/10/22  Colonoscopy 2016, was due 2021; agrees to schedule. Dexa @65, no risk factors. Calcium recs reviewed. Diagnoses and all orders for this visit:    Encounter for gynecological examination (general) (routine) without abnormal findings    History of hysterectomy -  BS 10/2022 [facility]    History of pulmonary embolism    Encounter for screening mammogram for malignant neoplasm of breast  -     Mammo screening bilateral w 3d & cad; Future    Other orders  -     Liquid-based pap, screening          Subjective:      Patient ID: Matthew Rangel is a 66 y.o. female. HPI Here for well check. The following portions of the patient's history were reviewed and updated as appropriate: She  has a past medical history of Anemia, Fibroid, H/O mammogram (02/16/2021), H/O pelvic ultrasound (02/16/2021), History of endometrial biopsy (08/31/2020), History of melanoma, History of screening mammography (05/10/2022), Melanoma of upper arm ([address]), Menorrhagia, Miscarriage (1999), Papanicolaou smear (02/2018), Pulmonary embolus ([address]), and Varicella (1972). She   Patient Active Problem List    Diagnosis Date Noted    Encounter for gynecological examination (general) (routine) without abnormal findings 06/07/2022    History of pulmonary embolism 04/24/2021    History of hysterectomy - [facility] 10/2022 [facility] 04/24/2021     She  has a past surgical history that includes Dilation and curettage of uterus (2001); [place] tooth extraction (1985); Tonsillectomy (1972); Skin cancer excision (2017); Combined hysteroscopy diagnostic / D&C (10/2020);  Mammo (historical) (Bilateral, 02/16/2021); Combined hysteroscopy diagnostic / D&C; Tonsillectomy; Skin cancer excision (2017); Hysteroscopy w/ polypectomy (10/2020); [place] tooth extraction; Colonoscopy (2016); and Gynecologic cryosurgery (1998). Her family history includes Cancer in her daughter; Hypertension in her brother and mother. She  reports that she has never smoked. She has never used smokeless tobacco. She reports that she does not currently use alcohol. She reports that she does not use drugs. Current Outpatient Medications   Medication Sig Dispense Refill    ibuprofen (MOTRIN) 200 mg tablet Take 1 tablet by mouth as needed      LORazepam (ATIVAN) 0.5 mg tablet Take 1 tablet by mouth as needed As needed before flying      Multiple Vitamin (MULTIVITAMIN PO) Take 1 tablet by mouth daily      pseudoephedrine (SUDAFED) 30 mg tablet Take 1 tablet by mouth as needed       No current facility-administered medications for this visit. She is allergic to sulfa antibiotics. .    Review of Systems  No breast, bladder, bowel changes.  No new persistent pain, bloating, early satiety or pelvic pressure  Occ hot flash    Objective:      /80   Ht 5' 3.75" (1.619 m)   Wt 63.9 kg (140 lb 12.8 oz)   LMP 06/06/2022 (Exact Date) Comment: irregular cycles  Breastfeeding No   BMI 24.36 kg/m²          Physical Exam    General appearance: no distress, pleasant  Neck: thyroid without nodules or thyromegaly, no palpable adenopathy  Lymph nodes: no palpable adenopathy  Breasts: no masses, nodes or skin changes  Abdomen: soft, non tender, no palpable masses  Pelvic exam: normal external genitalia, urethral meatus normal, vaginal cuff intact, no adnexal masses, non tender  Rectal exam: no masses, non tender, RV confirms above

## 2023-10-31 ENCOUNTER — ANNUAL EXAM (OUTPATIENT)
Dept: OBGYN CLINIC | Facility: CLINIC | Age: 58
End: 2023-10-31
Payer: COMMERCIAL

## 2023-10-31 VITALS
SYSTOLIC BLOOD PRESSURE: 128 MMHG | DIASTOLIC BLOOD PRESSURE: 80 MMHG | WEIGHT: 140.8 LBS | HEIGHT: 64 IN | BODY MASS INDEX: 24.04 KG/M2

## 2023-10-31 DIAGNOSIS — Z86.711 HISTORY OF PULMONARY EMBOLISM: ICD-10-CM

## 2023-10-31 DIAGNOSIS — Z90.710 HISTORY OF HYSTERECTOMY: ICD-10-CM

## 2023-10-31 DIAGNOSIS — Z12.31 ENCOUNTER FOR SCREENING MAMMOGRAM FOR MALIGNANT NEOPLASM OF BREAST: ICD-10-CM

## 2023-10-31 DIAGNOSIS — Z01.419 ENCOUNTER FOR GYNECOLOGICAL EXAMINATION (GENERAL) (ROUTINE) WITHOUT ABNORMAL FINDINGS: Primary | ICD-10-CM

## 2023-10-31 PROCEDURE — 99396 PREV VISIT EST AGE 40-64: CPT | Performed by: OBSTETRICS & GYNECOLOGY

## 2023-10-31 NOTE — ASSESSMENT & PLAN NOTE
Here for well check, SO happy after hysterectomy for benign disease. No menopausal concerns, no breast or gyn complaints. Normal breast and pelvic exams. Last pap 6//7/22 neg/HPV neg; now s/p hysterectomy  Mammo order given, last 5/10/22  Colonoscopy 2016, was due 2021; agrees to schedule. Dexa @65, no risk factors. Calcium recs reviewed.

## 2023-10-31 NOTE — LETTER
October 31, 2023     Mireille Romero MD  [address]    Patient: Casa Manrique   YOB: 1965   Date of Visit: 10/31/2023       Dear Dr. Ariela Lamb:    Jessi Gunn was in today for her annual gyn exam. Below are my notes for this visit. If you have questions, please do not hesitate to call me. I look forward to following your patient along with you. Sincerely,        Hyun Harrell MD        CC: No Recipients    Hyun Harrell MD  10/31/2023  8:57 AM  Sign when Signing Visit  Assessment/Plan:    Encounter for gynecological examination (general) (routine) without abnormal findings  Here for well check, SO happy after hysterectomy for benign disease. No menopausal concerns, no breast or gyn complaints. Normal breast and pelvic exams. Last pap 6//7/22 neg/HPV neg; now s/p hysterectomy  Mammo order given, last 5/10/22  Colonoscopy 2016, was due 2021; agrees to schedule. Dexa @65, no risk factors. Calcium recs reviewed. Diagnoses and all orders for this visit:    Encounter for gynecological examination (general) (routine) without abnormal findings    History of hysterectomy - [facility] BS 10/2022 [facility]    History of pulmonary embolism    Encounter for screening mammogram for malignant neoplasm of breast  -     Mammo screening bilateral w 3d & cad; Future    Other orders  -     Liquid-based pap, screening          Subjective:      Patient ID: Casa Manrique is a 66 y.o. female. HPI Here for well check. The following portions of the patient's history were reviewed and updated as appropriate: She  has a past medical history of Anemia, Fibroid, H/O mammogram (02/16/2021), H/O pelvic ultrasound (02/16/2021), History of endometrial biopsy (08/31/2020), History of melanoma, History of screening mammography (05/10/2022), Melanoma of upper arm ([address]), Menorrhagia, Miscarriage (1999), Papanicolaou smear (02/2018), Pulmonary embolus ([address]), and Varicella (1972).   She   Patient Active Problem List    Diagnosis Date Noted   • Encounter for gynecological examination (general) (routine) without abnormal findings 06/07/2022   • History of pulmonary embolism 04/24/2021   • History of hysterectomy - [facility] 10/2022 [facility] 04/24/2021     She  has a past surgical history that includes Dilation and curettage of uterus (2001); [place] tooth extraction (1985); Tonsillectomy (1972); Skin cancer excision (2017); Combined hysteroscopy diagnostic / D&C (10/2020); Mammo (historical) (Bilateral, 02/16/2021); Combined hysteroscopy diagnostic / D&C; Tonsillectomy; Skin cancer excision (2017); Hysteroscopy w/ polypectomy (10/2020); [place] tooth extraction; Colonoscopy (2016); and Gynecologic cryosurgery (1998). Her family history includes Cancer in her daughter; Hypertension in her brother and mother. She  reports that she has never smoked. She has never used smokeless tobacco. She reports that she does not currently use alcohol. She reports that she does not use drugs. Current Outpatient Medications   Medication Sig Dispense Refill   • ibuprofen (MOTRIN) 200 mg tablet Take 1 tablet by mouth as needed     • LORazepam (ATIVAN) 0.5 mg tablet Take 1 tablet by mouth as needed As needed before flying     • Multiple Vitamin (MULTIVITAMIN PO) Take 1 tablet by mouth daily     • pseudoephedrine (SUDAFED) 30 mg tablet Take 1 tablet by mouth as needed       No current facility-administered medications for this visit. She is allergic to sulfa antibiotics. .    Review of Systems  No breast, bladder, bowel changes.  No new persistent pain, bloating, early satiety or pelvic pressure  Occ hot flash    Objective:      /80   Ht 5' 3.75" (1.619 m)   Wt 63.9 kg (140 lb 12.8 oz)   LMP 06/06/2022 (Exact Date) Comment: irregular cycles  Breastfeeding No   BMI 24.36 kg/m²          Physical Exam    General appearance: no distress, pleasant  Neck: thyroid without nodules or thyromegaly, no palpable adenopathy  Lymph nodes: no palpable adenopathy  Breasts: no masses, nodes or skin changes  Abdomen: soft, non tender, no palpable masses  Pelvic exam: normal external genitalia, urethral meatus normal, vaginal cuff intact, no adnexal masses, non tender  Rectal exam: no masses, non tender, RV confirms above

## 2024-02-21 PROBLEM — Z01.419 ENCOUNTER FOR GYNECOLOGICAL EXAMINATION (GENERAL) (ROUTINE) WITHOUT ABNORMAL FINDINGS: Status: RESOLVED | Noted: 2022-06-07 | Resolved: 2024-02-21

## 2024-08-13 ENCOUNTER — TELEPHONE (OUTPATIENT)
Age: 59
End: 2024-08-13

## 2024-08-13 NOTE — TELEPHONE ENCOUNTER
Patient called and seen Dr Arita on 10/31/2023 and trying to get in for her yearly and we could not schedule her until 8/2025.  Please call patient back at [phone] . Thank you

## 2024-11-09 NOTE — PROGRESS NOTES
Assessment/Plan:    Encounter for gynecological examination (general) (routine) without abnormal findings  Here for well check, no breast or gyn concerns aside from weight gain.  Normal breast and pelvic exams.  Last pap 2022 neg/HPV neg; now s/p hysterectomy  Mammo order given, last 11/20/23 BIRADS 1B, reviewed on [employer]  Colonoscopy 2016, overdue, agrees to schedule ([facility])  Dexa @65, no risk factors. Calcium recs reviewed.    BMI 26.0-26.9,adult  Discussed challenging weight control in perimenopause and menopause. Reviewed decrease caloric requirements and need to decrease daily consumption and increase activity to maintain. Nutrition consult offered, provider information given.       Diagnoses and all orders for this visit:    Encounter for gynecological examination (general) (routine) without abnormal findings    Encounter for screening mammogram for malignant neoplasm of breast  -     Mammo screening bilateral w 3d and cad; Future    History of hysterectomy - [facility] 10/2022 [facility]    BMI 26.0-26.9,adult    Other orders  -     loratadine (CLARITIN) 10 mg tablet; Take 10 mg by mouth daily          Subjective:      Patient ID: Vandana Viera is a 59 y.o. female.    HPI Here for well check.    The following portions of the patient's history were reviewed and updated as appropriate: She  has a past medical history of Anemia, Fibroid, History of endometrial biopsy (08/31/2020), History of melanoma, History of screening mammography (05/10/2022), Melanoma of upper arm (HCC), Miscarriage (1999), Pulmonary embolus (HCC), and Varicella (1972).  She   Patient Active Problem List    Diagnosis Date Noted    BMI 26.0-26.9,adult 11/12/2024    History of pulmonary embolism 04/24/2021    History of hysterectomy - [facility] 10/2022 [facility] 04/24/2021     She  has a past surgical history that includes Dilation and curettage of uterus (2001); [place] tooth extraction (1985); Tonsillectomy (1972); Combined hysteroscopy diagnostic /  "D&C; Skin cancer excision (2017); Hysteroscopy w/ polypectomy (10/2020); Colonoscopy (2016); Gynecologic cryosurgery (1998); and Hysterectomy.  Her family history includes Cancer in her daughter; Hypertension in her brother and mother.  She  reports that she has never smoked. She has never used smokeless tobacco. She reports that she does not currently use alcohol. She reports that she does not use drugs.  Current Outpatient Medications   Medication Sig Dispense Refill    ibuprofen (MOTRIN) 200 mg tablet Take 1 tablet by mouth as needed      loratadine (CLARITIN) 10 mg tablet Take 10 mg by mouth daily      LORazepam (ATIVAN) 0.5 mg tablet Take 1 tablet by mouth as needed As needed before flying      Multiple Vitamin (MULTIVITAMIN PO) Take 1 tablet by mouth daily       No current facility-administered medications for this visit.     She is allergic to sulfa antibiotics..    Review of Systems  No breast, bladder, bowel changes. No new persistent pain, bloating, early satiety or pelvic pressure    Objective:    /64 (BP Location: Left arm, Patient Position: Sitting, Cuff Size: Standard)   Ht 5' 3.75\" (1.619 m)   Wt 69.4 kg (153 lb)   LMP 06/06/2022 (Exact Date) Comment: irregular cycles  BMI 26.47 kg/m²      Physical Exam    General appearance: no distress, pleasant  Neck: thyroid without nodules or thyromegaly, no palpable adenopathy  Lymph nodes: no palpable adenopathy  Breasts: no masses, nodes or skin changes  Abdomen: soft, non tender, no palpable masses  Pelvic exam: normal atrophic external genitalia, urethral meatus normal, vagina atrophic without lesions, cuff intact, no adnexal masses, non tender  Rectal exam: normal sphincter tone, no masses, RV confirms above    " 18

## 2024-11-12 ENCOUNTER — ANNUAL EXAM (OUTPATIENT)
Dept: OBGYN CLINIC | Facility: CLINIC | Age: 59
End: 2024-11-12
Payer: COMMERCIAL

## 2024-11-12 VITALS
BODY MASS INDEX: 26.12 KG/M2 | HEIGHT: 64 IN | WEIGHT: 153 LBS | DIASTOLIC BLOOD PRESSURE: 64 MMHG | SYSTOLIC BLOOD PRESSURE: 110 MMHG

## 2024-11-12 DIAGNOSIS — Z01.419 ENCOUNTER FOR GYNECOLOGICAL EXAMINATION (GENERAL) (ROUTINE) WITHOUT ABNORMAL FINDINGS: Primary | ICD-10-CM

## 2024-11-12 DIAGNOSIS — Z12.31 ENCOUNTER FOR SCREENING MAMMOGRAM FOR MALIGNANT NEOPLASM OF BREAST: ICD-10-CM

## 2024-11-12 DIAGNOSIS — Z90.710 HISTORY OF HYSTERECTOMY: ICD-10-CM

## 2024-11-12 PROCEDURE — S0612 ANNUAL GYNECOLOGICAL EXAMINA: HCPCS | Performed by: OBSTETRICS & GYNECOLOGY

## 2024-11-12 RX ORDER — LORATADINE 10 MG/1
10 TABLET ORAL DAILY
COMMUNITY

## 2024-11-12 NOTE — ASSESSMENT & PLAN NOTE
Here for well check, no breast or gyn concerns aside from weight gain.  Normal breast and pelvic exams.  Last pap 2022 neg/HPV neg; now s/p hysterectomy  Mammo order given, last 11/20/23 BIRADS 1B, reviewed on [employer]  Colonoscopy 2016, overdue, agrees to schedule ([place])  Dexa @65, no risk factors. Calcium recs reviewed.

## 2024-11-12 NOTE — PATIENT INSTRUCTIONS
Jacinta Burnett, nutritionist in [place].  GARY Mena- Nutrition Counseling Covered By Insurance  ChelseaGARY, MARIVEL ([email or web address])     Return to office in one year unless having any problems such as breast changes, bleeding, new persistent pain, new progressive bloating, new problems eating (getting full to quickly) or new constant urinary pressure that does not resolve in one week.    Continue to strive for 1200 mg of calcium and 1000 IU of vitamin D daily in diet and supplements combined for your bone health.  You can only absorb 600 mg of calcium at a time. Avoid excess calcium as this may adversely effect your heart.  There are 400 mg of calcium in an 8 oz serving of dairy.  [place] milk has 600 mg of calcium in an 8 oz serving.

## 2024-11-12 NOTE — ASSESSMENT & PLAN NOTE
Discussed challenging weight control in perimenopause and menopause. Reviewed decrease caloric requirements and need to decrease daily consumption and increase activity to maintain. Nutrition consult offered, provider information given.

## 2024-11-12 NOTE — LETTER
November 12, 2024     Alka Arita MD  [address] [facility] [address]    Patient: Vandana Viera   YOB: 1965   Date of Visit: 11/12/2024       Dear Dr. Arita:    Vandana Viera was in today for her annual gyn exam. Below are my notes for this visit.    If you have questions, please do not hesitate to call me. I look forward to following your patient along with you.         Sincerely,        Alka Arita MD        CC: MD Alka Lockett MD  11/12/2024  9:06 AM  Sign when Signing Visit  Assessment/Plan:    Encounter for gynecological examination (general) (routine) without abnormal findings  Here for well check, no breast or gyn concerns aside from weight gain.  Normal breast and pelvic exams.  Last pap 2022 neg/HPV neg; now s/p hysterectomy  Mammo order given, last 11/20/23 BIRADS 1B, reviewed on [email or web address]  Colonoscopy 2016, overdue, agrees to schedule ([facility])  Dexa @65, no risk factors. Calcium recs reviewed.    BMI 26.0-26.9,adult  Discussed challenging weight control in perimenopause and menopause. Reviewed decrease caloric requirements and need to decrease daily consumption and increase activity to maintain. Nutrition consult offered, provider information given.       Diagnoses and all orders for this visit:    Encounter for gynecological examination (general) (routine) without abnormal findings    Encounter for screening mammogram for malignant neoplasm of breast  -     Mammo screening bilateral w 3d and cad; Future    History of hysterectomy - [facility] 10/2022 [facility]    BMI 26.0-26.9,adult    Other orders  -     loratadine (CLARITIN) 10 mg tablet; Take 10 mg by mouth daily          Subjective:      Patient ID: Vandana Viera is a 59 y.o. female.    HPI Here for well check.    The following portions of the patient's history were reviewed and updated as appropriate: She  has a past medical history of Anemia, Fibroid, History of endometrial biopsy (08/31/2020), History of  "melanoma, History of screening mammography (05/10/2022), Melanoma of upper arm (HCC), Miscarriage (1999), Pulmonary embolus (HCC), and Varicella (1972).  She   Patient Active Problem List    Diagnosis Date Noted   • BMI 26.0-26.9,adult 11/12/2024   • History of pulmonary embolism 04/24/2021   • History of hysterectomy - [facility] 10/2022 [facility] 04/24/2021     She  has a past surgical history that includes Dilation and curettage of uterus (2001); [place] tooth extraction (1985); Tonsillectomy (1972); Combined hysteroscopy diagnostic / D&C; Skin cancer excision (2017); Hysteroscopy w/ polypectomy (10/2020); Colonoscopy (2016); Gynecologic cryosurgery (1998); and Hysterectomy.  Her family history includes Cancer in her daughter; Hypertension in her brother and mother.  She  reports that she has never smoked. She has never used smokeless tobacco. She reports that she does not currently use alcohol. She reports that she does not use drugs.  Current Outpatient Medications   Medication Sig Dispense Refill   • ibuprofen (MOTRIN) 200 mg tablet Take 1 tablet by mouth as needed     • loratadine (CLARITIN) 10 mg tablet Take 10 mg by mouth daily     • LORazepam (ATIVAN) 0.5 mg tablet Take 1 tablet by mouth as needed As needed before flying     • Multiple Vitamin (MULTIVITAMIN PO) Take 1 tablet by mouth daily       No current facility-administered medications for this visit.     She is allergic to sulfa antibiotics..    Review of Systems  No breast, bladder, bowel changes. No new persistent pain, bloating, early satiety or pelvic pressure    Objective:    /64 (BP Location: Left arm, Patient Position: Sitting, Cuff Size: Standard)   Ht 5' 3.75\" (1.619 m)   Wt 69.4 kg (153 lb)   LMP 06/06/2022 (Exact Date) Comment: irregular cycles  BMI 26.47 kg/m²      Physical Exam    General appearance: no distress, pleasant  Neck: thyroid without nodules or thyromegaly, no palpable adenopathy  Lymph nodes: no palpable adenopathy  Breasts: no " masses, nodes or skin changes  Abdomen: soft, non tender, no palpable masses  Pelvic exam: normal atrophic external genitalia, urethral meatus normal, vagina atrophic without lesions, cuff intact, no adnexal masses, non tender  Rectal exam: normal sphincter tone, no masses, RV confirms above